# Patient Record
Sex: FEMALE | Race: BLACK OR AFRICAN AMERICAN | NOT HISPANIC OR LATINO | URBAN - METROPOLITAN AREA
[De-identification: names, ages, dates, MRNs, and addresses within clinical notes are randomized per-mention and may not be internally consistent; named-entity substitution may affect disease eponyms.]

---

## 2022-09-20 ENCOUNTER — OFFICE VISIT (OUTPATIENT)
Dept: PRIMARY CARE CLINIC | Facility: CLINIC | Age: 42
End: 2022-09-20
Payer: COMMERCIAL

## 2022-09-20 VITALS
SYSTOLIC BLOOD PRESSURE: 140 MMHG | WEIGHT: 293 LBS | HEART RATE: 105 BPM | HEIGHT: 67 IN | DIASTOLIC BLOOD PRESSURE: 80 MMHG | OXYGEN SATURATION: 100 % | BODY MASS INDEX: 45.99 KG/M2

## 2022-09-20 DIAGNOSIS — R73.9 HYPERGLYCEMIA: ICD-10-CM

## 2022-09-20 DIAGNOSIS — Z12.4 SCREENING FOR CERVICAL CANCER: ICD-10-CM

## 2022-09-20 DIAGNOSIS — Z12.39 ENCOUNTER FOR SCREENING FOR MALIGNANT NEOPLASM OF BREAST, UNSPECIFIED SCREENING MODALITY: ICD-10-CM

## 2022-09-20 DIAGNOSIS — E66.01 CLASS 3 SEVERE OBESITY WITHOUT SERIOUS COMORBIDITY WITH BODY MASS INDEX (BMI) OF 45.0 TO 49.9 IN ADULT, UNSPECIFIED OBESITY TYPE: Primary | ICD-10-CM

## 2022-09-20 PROCEDURE — 1159F PR MEDICATION LIST DOCUMENTED IN MEDICAL RECORD: ICD-10-PCS | Mod: CPTII,S$GLB,, | Performed by: INTERNAL MEDICINE

## 2022-09-20 PROCEDURE — 99204 OFFICE O/P NEW MOD 45 MIN: CPT | Mod: S$GLB,,, | Performed by: INTERNAL MEDICINE

## 2022-09-20 PROCEDURE — 3079F DIAST BP 80-89 MM HG: CPT | Mod: CPTII,S$GLB,, | Performed by: INTERNAL MEDICINE

## 2022-09-20 PROCEDURE — 99204 PR OFFICE/OUTPT VISIT, NEW, LEVL IV, 45-59 MIN: ICD-10-PCS | Mod: S$GLB,,, | Performed by: INTERNAL MEDICINE

## 2022-09-20 PROCEDURE — 1159F MED LIST DOCD IN RCRD: CPT | Mod: CPTII,S$GLB,, | Performed by: INTERNAL MEDICINE

## 2022-09-20 PROCEDURE — 3008F PR BODY MASS INDEX (BMI) DOCUMENTED: ICD-10-PCS | Mod: CPTII,S$GLB,, | Performed by: INTERNAL MEDICINE

## 2022-09-20 PROCEDURE — 3077F SYST BP >= 140 MM HG: CPT | Mod: CPTII,S$GLB,, | Performed by: INTERNAL MEDICINE

## 2022-09-20 PROCEDURE — 3079F PR MOST RECENT DIASTOLIC BLOOD PRESSURE 80-89 MM HG: ICD-10-PCS | Mod: CPTII,S$GLB,, | Performed by: INTERNAL MEDICINE

## 2022-09-20 PROCEDURE — 3077F PR MOST RECENT SYSTOLIC BLOOD PRESSURE >= 140 MM HG: ICD-10-PCS | Mod: CPTII,S$GLB,, | Performed by: INTERNAL MEDICINE

## 2022-09-20 PROCEDURE — 3008F BODY MASS INDEX DOCD: CPT | Mod: CPTII,S$GLB,, | Performed by: INTERNAL MEDICINE

## 2022-09-20 RX ORDER — ARIPIPRAZOLE 5 MG/1
5 TABLET ORAL DAILY
COMMUNITY
Start: 2022-06-29

## 2022-09-20 RX ORDER — LISDEXAMFETAMINE DIMESYLATE 70 MG/1
60 CAPSULE ORAL EVERY MORNING
COMMUNITY
Start: 2022-09-12

## 2022-09-20 NOTE — PROGRESS NOTES
"Subjective:      Patient ID: Chele Welch is a 41 y.o. female.    Chief Complaint: Establish Care (She does have "white coat syndrome." She does see psych every 3 mths. )    HPI    Past Medical History:   Diagnosis Date    ADHD (attention deficit hyperactivity disorder)     Depression     PCOS (polycystic ovarian syndrome)     Sickle cell trait        History reviewed. No pertinent surgical history.     Family History   Problem Relation Age of Onset    Heart disease Mother     Depression Mother     COPD Mother     Diabetes Mother     Hypertension Mother     Heart disease Father     Hyperlipidemia Father     Hypertension Father     Sickle cell trait Father     Diabetes Sister     Hypertension Sister    Maternal GM had cervical cancer    Social History     Socioeconomic History    Marital status: Unknown   Tobacco Use    Smoking status: Never     Passive exposure: Never    Smokeless tobacco: Never   Substance and Sexual Activity    Alcohol use: Yes     Comment: OCC    Drug use: Never    Sexual activity: Yes     Partners: Male     Birth control/protection: I.U.D.         Review of Systems   Constitutional:  Negative for chills, fever and weight loss.   Eyes:  Negative for blurred vision.   Respiratory:  Negative for cough, shortness of breath and wheezing.    Cardiovascular:  Negative for chest pain, palpitations and leg swelling.   Gastrointestinal:  Negative for abdominal pain, constipation, diarrhea, nausea and vomiting.   Genitourinary:  Negative for dysuria, frequency and urgency.   Musculoskeletal:  Negative for falls.   Skin:  Negative for rash.   Neurological:  Negative for dizziness and headaches.   Psychiatric/Behavioral:  Positive for depression. Negative for substance abuse and suicidal ideas. The patient is nervous/anxious.    Objective:     Physical Exam  Vitals reviewed.   Constitutional:       Appearance: Normal appearance. She is obese.   HENT:      Head: Normocephalic.   Eyes:      Extraocular " "Movements: Extraocular movements intact.      Conjunctiva/sclera: Conjunctivae normal.      Pupils: Pupils are equal, round, and reactive to light.   Cardiovascular:      Rate and Rhythm: Normal rate and regular rhythm.   Pulmonary:      Effort: Pulmonary effort is normal.      Breath sounds: Normal breath sounds.   Abdominal:      General: Bowel sounds are normal.   Musculoskeletal:         General: Normal range of motion.   Skin:     General: Skin is warm.      Capillary Refill: Capillary refill takes less than 2 seconds.   Neurological:      General: No focal deficit present.      Mental Status: She is alert and oriented to person, place, and time.   Psychiatric:         Mood and Affect: Mood normal.     BP (!) 140/80 (BP Location: Left arm, Patient Position: Sitting, BP Method: Medium (Manual))   Pulse 105   Ht 5' 7" (1.702 m)   Wt 136.1 kg (300 lb)   SpO2 100%   BMI 46.99 kg/m²     Assessment:       ICD-10-CM ICD-9-CM   1. Class 3 severe obesity without serious comorbidity with body mass index (BMI) of 45.0 to 49.9 in adult, unspecified obesity type  E66.01 278.01    Z68.42 V85.42   2. Encounter for screening for malignant neoplasm of breast, unspecified screening modality  Z12.39 V76.10   3. Screening for cervical cancer  Z12.4 V76.2       Plan:     Medication List with Changes/Refills   Current Medications    ARIPIPRAZOLE (ABILIFY) 5 MG TAB    Take 5 mg by mouth once daily.    VYVANSE 60 MG CAPSULE    Take 60 mg by mouth every morning.        Class 3 severe obesity without serious comorbidity with body mass index (BMI) of 45.0 to 49.9 in adult, unspecified obesity type  -     Ambulatory referral/consult to Nutrition Services; Future; Expected date: 09/27/2022    Encounter for screening for malignant neoplasm of breast, unspecified screening modality  -     Mammo Digital Screening Bilat; Future; Expected date: 09/20/2022    Screening for cervical cancer  -     Ambulatory referral/consult to Obstetrics / " Gynecology; Future; Expected date: 09/27/2022

## 2022-09-21 ENCOUNTER — PATIENT MESSAGE (OUTPATIENT)
Dept: PRIMARY CARE CLINIC | Facility: CLINIC | Age: 42
End: 2022-09-21
Payer: COMMERCIAL

## 2022-10-11 ENCOUNTER — OFFICE VISIT (OUTPATIENT)
Dept: OBSTETRICS AND GYNECOLOGY | Facility: CLINIC | Age: 42
End: 2022-10-11
Payer: COMMERCIAL

## 2022-10-11 VITALS
BODY MASS INDEX: 47.3 KG/M2 | SYSTOLIC BLOOD PRESSURE: 133 MMHG | WEIGHT: 293 LBS | HEART RATE: 103 BPM | DIASTOLIC BLOOD PRESSURE: 70 MMHG

## 2022-10-11 DIAGNOSIS — E66.01 CLASS 3 SEVERE OBESITY DUE TO EXCESS CALORIES WITHOUT SERIOUS COMORBIDITY WITH BODY MASS INDEX (BMI) OF 45.0 TO 49.9 IN ADULT: ICD-10-CM

## 2022-10-11 DIAGNOSIS — Z01.419 WELL WOMAN EXAM WITH ROUTINE GYNECOLOGICAL EXAM: Primary | ICD-10-CM

## 2022-10-11 DIAGNOSIS — Z12.4 SCREENING FOR CERVICAL CANCER: ICD-10-CM

## 2022-10-11 PROBLEM — E66.813 CLASS 3 SEVERE OBESITY DUE TO EXCESS CALORIES WITH BODY MASS INDEX (BMI) OF 45.0 TO 49.9 IN ADULT: Status: ACTIVE | Noted: 2022-10-11

## 2022-10-11 PROCEDURE — 1160F PR REVIEW ALL MEDS BY PRESCRIBER/CLIN PHARMACIST DOCUMENTED: ICD-10-PCS | Mod: CPTII,S$GLB,, | Performed by: STUDENT IN AN ORGANIZED HEALTH CARE EDUCATION/TRAINING PROGRAM

## 2022-10-11 PROCEDURE — 3078F PR MOST RECENT DIASTOLIC BLOOD PRESSURE < 80 MM HG: ICD-10-PCS | Mod: CPTII,S$GLB,, | Performed by: STUDENT IN AN ORGANIZED HEALTH CARE EDUCATION/TRAINING PROGRAM

## 2022-10-11 PROCEDURE — 1160F RVW MEDS BY RX/DR IN RCRD: CPT | Mod: CPTII,S$GLB,, | Performed by: STUDENT IN AN ORGANIZED HEALTH CARE EDUCATION/TRAINING PROGRAM

## 2022-10-11 PROCEDURE — 3078F DIAST BP <80 MM HG: CPT | Mod: CPTII,S$GLB,, | Performed by: STUDENT IN AN ORGANIZED HEALTH CARE EDUCATION/TRAINING PROGRAM

## 2022-10-11 PROCEDURE — 3075F SYST BP GE 130 - 139MM HG: CPT | Mod: CPTII,S$GLB,, | Performed by: STUDENT IN AN ORGANIZED HEALTH CARE EDUCATION/TRAINING PROGRAM

## 2022-10-11 PROCEDURE — 99386 PREV VISIT NEW AGE 40-64: CPT | Mod: S$GLB,,, | Performed by: STUDENT IN AN ORGANIZED HEALTH CARE EDUCATION/TRAINING PROGRAM

## 2022-10-11 PROCEDURE — 3008F PR BODY MASS INDEX (BMI) DOCUMENTED: ICD-10-PCS | Mod: CPTII,S$GLB,, | Performed by: STUDENT IN AN ORGANIZED HEALTH CARE EDUCATION/TRAINING PROGRAM

## 2022-10-11 PROCEDURE — 3008F BODY MASS INDEX DOCD: CPT | Mod: CPTII,S$GLB,, | Performed by: STUDENT IN AN ORGANIZED HEALTH CARE EDUCATION/TRAINING PROGRAM

## 2022-10-11 PROCEDURE — 3075F PR MOST RECENT SYSTOLIC BLOOD PRESS GE 130-139MM HG: ICD-10-PCS | Mod: CPTII,S$GLB,, | Performed by: STUDENT IN AN ORGANIZED HEALTH CARE EDUCATION/TRAINING PROGRAM

## 2022-10-11 PROCEDURE — 99386 PR PREVENTIVE VISIT,NEW,40-64: ICD-10-PCS | Mod: S$GLB,,, | Performed by: STUDENT IN AN ORGANIZED HEALTH CARE EDUCATION/TRAINING PROGRAM

## 2022-10-11 PROCEDURE — 1159F MED LIST DOCD IN RCRD: CPT | Mod: CPTII,S$GLB,, | Performed by: STUDENT IN AN ORGANIZED HEALTH CARE EDUCATION/TRAINING PROGRAM

## 2022-10-11 PROCEDURE — 1159F PR MEDICATION LIST DOCUMENTED IN MEDICAL RECORD: ICD-10-PCS | Mod: CPTII,S$GLB,, | Performed by: STUDENT IN AN ORGANIZED HEALTH CARE EDUCATION/TRAINING PROGRAM

## 2022-10-11 RX ORDER — DULOXETIN HYDROCHLORIDE 60 MG/1
60 CAPSULE, DELAYED RELEASE ORAL DAILY
COMMUNITY
Start: 2022-06-29

## 2022-10-11 NOTE — PROGRESS NOTES
Chief Complaint: Annual Exam    HPI: 42 y.o.  here for Annual Exam.  She has no complaints today.    Review of Systems   Constitutional:  Negative for chills and fever.   HENT:  Negative for congestion and sore throat.    Respiratory:  Negative for cough and shortness of breath.    Cardiovascular:  Negative for chest pain and palpitations.   Gastrointestinal:  Negative for abdominal pain, nausea and vomiting.   Genitourinary:  Negative for dysuria, frequency and urgency.   Musculoskeletal:  Negative for back pain.   Skin:  Negative for itching and rash.   Neurological:  Negative for headaches.   All other systems reviewed and are negative.    Past Medical History ADHD, depression, SCT, PCOS  Past Surgical History none  Past OB History: G0  Past Gyn History: Denies prior abnormal pap smears, denies STD's, menstrual history as above.   Contraception History: IUD  Social History: + etoh use, denies t/d  Family History denies breast, colon, ovarian, or uterine cancer  Allergies: NKDA  Current Outpatient Medications   Medication Instructions    ARIPiprazole (ABILIFY) 5 mg, Oral, Daily    DULoxetine (CYMBALTA) 60 MG capsule No dose, route, or frequency recorded.    VYVANSE 60 mg, Oral, Every morning     Physical Exam:  Vitals:    10/11/22 1022   BP: 133/70   Pulse: 103     Body mass index is 47.3 kg/m².  Physical Exam  Vitals reviewed. Exam conducted with a chaperone present.   Constitutional:       Appearance: Normal appearance. She is obese.   HENT:      Head: Normocephalic and atraumatic.   Eyes:      Extraocular Movements: Extraocular movements intact.      Pupils: Pupils are equal, round, and reactive to light.   Pulmonary:      Effort: Pulmonary effort is normal. No respiratory distress.   Chest:   Breasts:     Breasts are symmetrical.      Right: No inverted nipple, mass, nipple discharge, skin change or tenderness.      Left: No inverted nipple, mass, nipple discharge, skin change or tenderness.    Abdominal:      General: There is no distension.      Palpations: Abdomen is soft.      Tenderness: There is no abdominal tenderness.   Genitourinary:     Comments: Normal external female genitalia, no masses or lesions, vagina pink with rugated, no vaginal bleeding or discharge, not friable cervix  Musculoskeletal:         General: No swelling or tenderness. Normal range of motion.      Cervical back: Normal range of motion and neck supple.   Lymphadenopathy:      Upper Body:      Right upper body: No supraclavicular or axillary adenopathy.      Left upper body: No supraclavicular or axillary adenopathy.   Skin:     General: Skin is warm and dry.   Neurological:      General: No focal deficit present.      Mental Status: She is alert and oriented to person, place, and time.        ASSESSMENT:   Patient is a 42 y.o.  who presents for annual exam     Patient Active Problem List   Diagnosis    Class 3 severe obesity due to excess calories with body mass index (BMI) of 45.0 to 49.9 in adult     PLAN:  Pap today   Mammogram ordered today  Counseling on self-breast exams, diet, and exercise.  Return to clinic in 1 year for annual sooner if needed

## 2022-10-14 LAB — Lab: NORMAL

## 2023-01-17 LAB — BCS RECOMMENDATION EXT: NORMAL

## 2023-01-23 ENCOUNTER — PATIENT MESSAGE (OUTPATIENT)
Dept: PRIMARY CARE CLINIC | Facility: CLINIC | Age: 43
End: 2023-01-23
Payer: COMMERCIAL

## 2023-01-23 ENCOUNTER — PATIENT OUTREACH (OUTPATIENT)
Dept: ADMINISTRATIVE | Facility: HOSPITAL | Age: 43
End: 2023-01-23
Payer: COMMERCIAL

## 2023-01-26 ENCOUNTER — OFFICE VISIT (OUTPATIENT)
Dept: PRIMARY CARE CLINIC | Facility: CLINIC | Age: 43
End: 2023-01-26
Payer: COMMERCIAL

## 2023-01-26 VITALS
WEIGHT: 293 LBS | HEIGHT: 67 IN | SYSTOLIC BLOOD PRESSURE: 150 MMHG | BODY MASS INDEX: 45.99 KG/M2 | OXYGEN SATURATION: 99 % | HEART RATE: 86 BPM | DIASTOLIC BLOOD PRESSURE: 90 MMHG

## 2023-01-26 DIAGNOSIS — E28.2 PCOS (POLYCYSTIC OVARIAN SYNDROME): ICD-10-CM

## 2023-01-26 DIAGNOSIS — Z13.1 SCREENING FOR DIABETES MELLITUS: Primary | ICD-10-CM

## 2023-01-26 DIAGNOSIS — E66.01 CLASS 3 SEVERE OBESITY WITHOUT SERIOUS COMORBIDITY WITH BODY MASS INDEX (BMI) OF 45.0 TO 49.9 IN ADULT, UNSPECIFIED OBESITY TYPE: ICD-10-CM

## 2023-01-26 PROCEDURE — 3044F HG A1C LEVEL LT 7.0%: CPT | Mod: CPTII,S$GLB,, | Performed by: INTERNAL MEDICINE

## 2023-01-26 PROCEDURE — 3008F PR BODY MASS INDEX (BMI) DOCUMENTED: ICD-10-PCS | Mod: CPTII,S$GLB,, | Performed by: INTERNAL MEDICINE

## 2023-01-26 PROCEDURE — 3044F PR MOST RECENT HEMOGLOBIN A1C LEVEL <7.0%: ICD-10-PCS | Mod: CPTII,S$GLB,, | Performed by: INTERNAL MEDICINE

## 2023-01-26 PROCEDURE — 1159F MED LIST DOCD IN RCRD: CPT | Mod: CPTII,S$GLB,, | Performed by: INTERNAL MEDICINE

## 2023-01-26 PROCEDURE — 3077F SYST BP >= 140 MM HG: CPT | Mod: CPTII,S$GLB,, | Performed by: INTERNAL MEDICINE

## 2023-01-26 PROCEDURE — 1159F PR MEDICATION LIST DOCUMENTED IN MEDICAL RECORD: ICD-10-PCS | Mod: CPTII,S$GLB,, | Performed by: INTERNAL MEDICINE

## 2023-01-26 PROCEDURE — 99214 OFFICE O/P EST MOD 30 MIN: CPT | Mod: S$GLB,,, | Performed by: INTERNAL MEDICINE

## 2023-01-26 PROCEDURE — 99214 PR OFFICE/OUTPT VISIT, EST, LEVL IV, 30-39 MIN: ICD-10-PCS | Mod: S$GLB,,, | Performed by: INTERNAL MEDICINE

## 2023-01-26 PROCEDURE — 3008F BODY MASS INDEX DOCD: CPT | Mod: CPTII,S$GLB,, | Performed by: INTERNAL MEDICINE

## 2023-01-26 PROCEDURE — 3080F DIAST BP >= 90 MM HG: CPT | Mod: CPTII,S$GLB,, | Performed by: INTERNAL MEDICINE

## 2023-01-26 PROCEDURE — 3077F PR MOST RECENT SYSTOLIC BLOOD PRESSURE >= 140 MM HG: ICD-10-PCS | Mod: CPTII,S$GLB,, | Performed by: INTERNAL MEDICINE

## 2023-01-26 PROCEDURE — 3080F PR MOST RECENT DIASTOLIC BLOOD PRESSURE >= 90 MM HG: ICD-10-PCS | Mod: CPTII,S$GLB,, | Performed by: INTERNAL MEDICINE

## 2023-01-26 RX ORDER — METFORMIN HYDROCHLORIDE 500 MG/1
500 TABLET ORAL
Qty: 90 TABLET | Refills: 0 | Status: SHIPPED | OUTPATIENT
Start: 2023-01-26 | End: 2023-06-01 | Stop reason: SDUPTHER

## 2023-01-26 NOTE — PROGRESS NOTES
Subjective:      Patient ID: Chele Welch is a 42 y.o. female.    Chief Complaint: Follow-up and MAMMOGRAM RESULT     HPI    Past Medical History:   Diagnosis Date    ADHD (attention deficit hyperactivity disorder)     Depression     PCOS (polycystic ovarian syndrome)     Sickle cell trait      Patient here for follow up. She has been established with Dr Razo and she sees a psychiatrist. She has not lost any weight. She is morbidly obese.   She also has some hirsutism noted. I did refer her to nutrition at the last visit  She has no acute complaints      Review of Systems   Constitutional:  Negative for chills, fever and weight loss.   Respiratory:  Negative for cough, shortness of breath and wheezing.    Cardiovascular:  Negative for chest pain, palpitations and leg swelling.   Gastrointestinal:  Negative for abdominal pain, constipation, diarrhea, nausea and vomiting.   Genitourinary:  Negative for dysuria, frequency and urgency.   Musculoskeletal:  Negative for falls and joint pain.   Skin:  Negative for rash.   Neurological:  Negative for dizziness and headaches.   Psychiatric/Behavioral:  Positive for depression. Negative for suicidal ideas. The patient is nervous/anxious.    Objective:     Physical Exam  Vitals reviewed.   Constitutional:       Appearance: Normal appearance. She is obese.   HENT:      Head: Normocephalic.      Mouth/Throat:      Mouth: Mucous membranes are moist.      Pharynx: Oropharynx is clear.   Eyes:      Extraocular Movements: Extraocular movements intact.      Conjunctiva/sclera: Conjunctivae normal.      Pupils: Pupils are equal, round, and reactive to light.   Cardiovascular:      Rate and Rhythm: Normal rate and regular rhythm.   Pulmonary:      Effort: Pulmonary effort is normal.      Breath sounds: Normal breath sounds.   Abdominal:      General: Bowel sounds are normal.   Musculoskeletal:      Right lower leg: No edema.      Left lower leg: No edema.   Skin:     General:  "Skin is warm.   Neurological:      General: No focal deficit present.      Mental Status: She is alert and oriented to person, place, and time.   Psychiatric:         Mood and Affect: Mood normal.     BP (!) 150/90 (BP Location: Right arm, Patient Position: Sitting, BP Method: Large (Manual))   Pulse 86   Ht 5' 7" (1.702 m)   Wt 135.6 kg (299 lb)   SpO2 99%   BMI 46.83 kg/m²     Assessment:       ICD-10-CM ICD-9-CM   1. Screening for diabetes mellitus  Z13.1 V77.1   2. PCOS (polycystic ovarian syndrome)  E28.2 256.4   3. Class 3 severe obesity without serious comorbidity with body mass index (BMI) of 45.0 to 49.9 in adult, unspecified obesity type  E66.01 278.01    Z68.42 V85.42       Plan:     Medication List with Changes/Refills   New Medications    METFORMIN (GLUCOPHAGE) 500 MG TABLET    Take 1 tablet (500 mg total) by mouth daily with breakfast.   Current Medications    ARIPIPRAZOLE (ABILIFY) 5 MG TAB    Take 5 mg by mouth once daily.    DULOXETINE (CYMBALTA) 60 MG CAPSULE    Take 60 mg by mouth once daily.    VYVANSE 60 MG CAPSULE    Take 60 mg by mouth every morning.        1. Screening for diabetes mellitus  -     Hemoglobin A1C; Future; Expected date: 01/26/2023    2. PCOS (polycystic ovarian syndrome)  -     metFORMIN (GLUCOPHAGE) 500 MG tablet; Take 1 tablet (500 mg total) by mouth daily with breakfast.  Dispense: 90 tablet; Refill: 0    3. Class 3 severe obesity without serious comorbidity with body mass index (BMI) of 45.0 to 49.9 in adult, unspecified obesity type  -     Lipid Panel; Future; Expected date: 01/26/2023       Counseled again on diet and exercise. Avoid carbonated or beverages high in sugar content        Future Appointments   Date Time Provider Department Center   5/2/2023 11:00 AM Ute Gao MD [facility]   10/26/2023 10:30 AM Tavares Razo MD [id number]  [place]                "

## 2023-01-27 LAB
CHOLEST SERPL-MCNC: 222 MG/DL
CHOLEST/HDLC SERPL: 5 (CALC)
HBA1C MFR BLD: 6 % OF TOTAL HGB
HDLC SERPL-MCNC: 44 MG/DL
LDLC SERPL CALC-MCNC: 157 MG/DL (CALC)
NONHDLC SERPL-MCNC: 178 MG/DL (CALC)
TRIGL SERPL-MCNC: 98 MG/DL

## 2023-02-28 ENCOUNTER — PATIENT MESSAGE (OUTPATIENT)
Dept: PRIMARY CARE CLINIC | Facility: CLINIC | Age: 43
End: 2023-02-28
Payer: COMMERCIAL

## 2023-03-31 ENCOUNTER — PATIENT MESSAGE (OUTPATIENT)
Dept: FAMILY MEDICINE | Facility: CLINIC | Age: 43
End: 2023-03-31
Payer: COMMERCIAL

## 2023-06-01 ENCOUNTER — OFFICE VISIT (OUTPATIENT)
Dept: PRIMARY CARE CLINIC | Facility: CLINIC | Age: 43
End: 2023-06-01
Payer: COMMERCIAL

## 2023-06-01 VITALS
HEART RATE: 96 BPM | BODY MASS INDEX: 45.99 KG/M2 | HEIGHT: 67 IN | DIASTOLIC BLOOD PRESSURE: 86 MMHG | OXYGEN SATURATION: 98 % | WEIGHT: 293 LBS | SYSTOLIC BLOOD PRESSURE: 130 MMHG

## 2023-06-01 DIAGNOSIS — E66.01 CLASS 3 SEVERE OBESITY WITHOUT SERIOUS COMORBIDITY WITH BODY MASS INDEX (BMI) OF 45.0 TO 49.9 IN ADULT, UNSPECIFIED OBESITY TYPE: Primary | ICD-10-CM

## 2023-06-01 DIAGNOSIS — E28.2 PCOS (POLYCYSTIC OVARIAN SYNDROME): ICD-10-CM

## 2023-06-01 PROCEDURE — 3075F PR MOST RECENT SYSTOLIC BLOOD PRESS GE 130-139MM HG: ICD-10-PCS | Mod: CPTII,S$GLB,, | Performed by: INTERNAL MEDICINE

## 2023-06-01 PROCEDURE — 3044F PR MOST RECENT HEMOGLOBIN A1C LEVEL <7.0%: ICD-10-PCS | Mod: CPTII,S$GLB,, | Performed by: INTERNAL MEDICINE

## 2023-06-01 PROCEDURE — 99213 OFFICE O/P EST LOW 20 MIN: CPT | Mod: S$GLB,,, | Performed by: INTERNAL MEDICINE

## 2023-06-01 PROCEDURE — 99213 PR OFFICE/OUTPT VISIT, EST, LEVL III, 20-29 MIN: ICD-10-PCS | Mod: S$GLB,,, | Performed by: INTERNAL MEDICINE

## 2023-06-01 PROCEDURE — 3008F BODY MASS INDEX DOCD: CPT | Mod: CPTII,S$GLB,, | Performed by: INTERNAL MEDICINE

## 2023-06-01 PROCEDURE — 3079F DIAST BP 80-89 MM HG: CPT | Mod: CPTII,S$GLB,, | Performed by: INTERNAL MEDICINE

## 2023-06-01 PROCEDURE — 1159F PR MEDICATION LIST DOCUMENTED IN MEDICAL RECORD: ICD-10-PCS | Mod: CPTII,S$GLB,, | Performed by: INTERNAL MEDICINE

## 2023-06-01 PROCEDURE — 3044F HG A1C LEVEL LT 7.0%: CPT | Mod: CPTII,S$GLB,, | Performed by: INTERNAL MEDICINE

## 2023-06-01 PROCEDURE — 3079F PR MOST RECENT DIASTOLIC BLOOD PRESSURE 80-89 MM HG: ICD-10-PCS | Mod: CPTII,S$GLB,, | Performed by: INTERNAL MEDICINE

## 2023-06-01 PROCEDURE — 3008F PR BODY MASS INDEX (BMI) DOCUMENTED: ICD-10-PCS | Mod: CPTII,S$GLB,, | Performed by: INTERNAL MEDICINE

## 2023-06-01 PROCEDURE — 1159F MED LIST DOCD IN RCRD: CPT | Mod: CPTII,S$GLB,, | Performed by: INTERNAL MEDICINE

## 2023-06-01 PROCEDURE — 3075F SYST BP GE 130 - 139MM HG: CPT | Mod: CPTII,S$GLB,, | Performed by: INTERNAL MEDICINE

## 2023-06-01 RX ORDER — METFORMIN HYDROCHLORIDE 500 MG/1
500 TABLET ORAL
Qty: 90 TABLET | Refills: 0 | Status: SHIPPED | OUTPATIENT
Start: 2023-06-01 | End: 2023-09-05

## 2023-06-01 NOTE — PROGRESS NOTES
Subjective:      Patient ID: Chele Welch is a 42 y.o. female.    Chief Complaint: Follow-up (She states has not used the metformin ever. )    HPI    Past Medical History:   Diagnosis Date    ADHD (attention deficit hyperactivity disorder)     Depression     PCOS (polycystic ovarian syndrome)     Sickle cell trait          Patient here for follow up. She states she has been out of town and did not hear back from the nutritionist and has not stated her metformin     Review of Systems   Constitutional:  Negative for chills and fever.   HENT:  Negative for hearing loss.    Eyes:  Negative for blurred vision.   Respiratory:  Negative for cough, shortness of breath and wheezing.    Cardiovascular:  Negative for chest pain, palpitations and leg swelling.   Gastrointestinal:  Negative for abdominal pain, blood in stool, constipation, diarrhea, melena, nausea and vomiting.   Genitourinary:  Negative for dysuria, frequency and urgency.   Musculoskeletal:  Negative for falls.   Skin:  Negative for rash.   Neurological:  Negative for dizziness and headaches.   Endo/Heme/Allergies:  Does not bruise/bleed easily.   Psychiatric/Behavioral:  Negative for depression. The patient is not nervous/anxious.    Objective:     Physical Exam  Vitals reviewed.   Constitutional:       Appearance: Normal appearance.   HENT:      Head: Normocephalic.      Mouth/Throat:      Mouth: Mucous membranes are moist.      Pharynx: Oropharynx is clear.   Eyes:      Extraocular Movements: Extraocular movements intact.      Conjunctiva/sclera: Conjunctivae normal.      Pupils: Pupils are equal, round, and reactive to light.   Cardiovascular:      Rate and Rhythm: Normal rate and regular rhythm.   Pulmonary:      Effort: Pulmonary effort is normal.      Breath sounds: Normal breath sounds.   Abdominal:      General: Bowel sounds are normal.   Musculoskeletal:      Right lower leg: No edema.      Left lower leg: No edema.   Skin:     General: Skin is  "warm.      Capillary Refill: Capillary refill takes less than 2 seconds.   Neurological:      General: No focal deficit present.      Mental Status: She is alert.   Psychiatric:         Mood and Affect: Mood normal.     /86 (BP Location: Left arm, Patient Position: Sitting, BP Method: Medium (Automatic))   Pulse 96   Ht 5' 7" (1.702 m)   Wt (!) 140.3 kg (309 lb 3.2 oz)   SpO2 98%   BMI 48.43 kg/m²     Assessment:       ICD-10-CM ICD-9-CM   1. Class 3 severe obesity without serious comorbidity with body mass index (BMI) of 45.0 to 49.9 in adult, unspecified obesity type  E66.01 278.01    Z68.42 V85.42   2. PCOS (polycystic ovarian syndrome)  E28.2 256.4       Plan:     Medication List with Changes/Refills   Current Medications    ARIPIPRAZOLE (ABILIFY) 5 MG TAB    Take 5 mg by mouth once daily.    DULOXETINE (CYMBALTA) 60 MG CAPSULE    Take 60 mg by mouth once daily.    LISDEXAMFETAMINE (VYVANSE) 70 MG CAPSULE    Take 60 mg by mouth every morning.   Changed and/or Refilled Medications    Modified Medication Previous Medication    METFORMIN (GLUCOPHAGE) 500 MG TABLET metFORMIN (GLUCOPHAGE) 500 MG tablet       Take 1 tablet (500 mg total) by mouth daily with breakfast.    Take 1 tablet (500 mg total) by mouth daily with breakfast.        1. Class 3 severe obesity without serious comorbidity with body mass index (BMI) of 45.0 to 49.9 in adult, unspecified obesity type  -     Ambulatory referral/consult to Nutrition Services; Future; Expected date: 06/11/2023    2. PCOS (polycystic ovarian syndrome)  -     metFORMIN (GLUCOPHAGE) 500 MG tablet; Take 1 tablet (500 mg total) by mouth daily with breakfast.  Dispense: 90 tablet; Refill: 0           Future Appointments   Date Time Provider Department Center   9/1/2023  9:40 AM Ute Gao MD [id number]   10/26/2023 10:30 AM Tavares Razo MD [id number] [place]                    "

## 2023-08-04 ENCOUNTER — PATIENT MESSAGE (OUTPATIENT)
Dept: PRIMARY CARE CLINIC | Facility: CLINIC | Age: 43
End: 2023-08-04
Payer: COMMERCIAL

## 2023-08-07 DIAGNOSIS — E28.2 PCOS (POLYCYSTIC OVARIAN SYNDROME): ICD-10-CM

## 2023-08-07 DIAGNOSIS — R73.9 HYPERGLYCEMIA: ICD-10-CM

## 2023-08-07 DIAGNOSIS — R73.03 PRE-DIABETES: Primary | ICD-10-CM

## 2023-08-07 RX ORDER — SEMAGLUTIDE 0.68 MG/ML
0.5 INJECTION, SOLUTION SUBCUTANEOUS
Qty: 4 EACH | Refills: 3 | Status: SHIPPED | OUTPATIENT
Start: 2023-08-07

## 2023-08-09 ENCOUNTER — TELEPHONE (OUTPATIENT)
Dept: PRIMARY CARE CLINIC | Facility: CLINIC | Age: 43
End: 2023-08-09
Payer: COMMERCIAL

## 2023-08-09 NOTE — TELEPHONE ENCOUNTER
PT INFORMED THAT HER INS DENIED THE REQUEST DUE TO MED BEING CONSIDERED INVESTIGATIONAL AND NOT APPROVED FOR THE DX OF PCOS

## 2023-08-09 NOTE — TELEPHONE ENCOUNTER
PT'S INS DENIED THE REQUEST FOR OZEMPIC FOR DX PCOS --INS STATES IT IS CONSIDERED INVESTIGATIONAL--REQUESTED DRUG IS NOT APPROVED BY THE FDA FOR THE REQUESTED MEDICATION/DIAGNOSIS-SERVICES DETERMINED TO BE INVESTIGATIONAL IS NOT COVERED BY [employer]

## 2023-09-01 ENCOUNTER — OFFICE VISIT (OUTPATIENT)
Dept: PRIMARY CARE CLINIC | Facility: CLINIC | Age: 43
End: 2023-09-01
Payer: COMMERCIAL

## 2023-09-01 VITALS
RESPIRATION RATE: 18 BRPM | BODY MASS INDEX: 45.99 KG/M2 | SYSTOLIC BLOOD PRESSURE: 138 MMHG | WEIGHT: 293 LBS | OXYGEN SATURATION: 99 % | HEART RATE: 86 BPM | DIASTOLIC BLOOD PRESSURE: 82 MMHG | HEIGHT: 67 IN

## 2023-09-01 DIAGNOSIS — R73.9 HYPERGLYCEMIA: Primary | ICD-10-CM

## 2023-09-01 PROCEDURE — 3075F PR MOST RECENT SYSTOLIC BLOOD PRESS GE 130-139MM HG: ICD-10-PCS | Mod: CPTII,S$GLB,, | Performed by: INTERNAL MEDICINE

## 2023-09-01 PROCEDURE — 3079F DIAST BP 80-89 MM HG: CPT | Mod: CPTII,S$GLB,, | Performed by: INTERNAL MEDICINE

## 2023-09-01 PROCEDURE — 99213 OFFICE O/P EST LOW 20 MIN: CPT | Mod: S$GLB,,, | Performed by: INTERNAL MEDICINE

## 2023-09-01 PROCEDURE — 1159F MED LIST DOCD IN RCRD: CPT | Mod: CPTII,S$GLB,, | Performed by: INTERNAL MEDICINE

## 2023-09-01 PROCEDURE — 99213 PR OFFICE/OUTPT VISIT, EST, LEVL III, 20-29 MIN: ICD-10-PCS | Mod: S$GLB,,, | Performed by: INTERNAL MEDICINE

## 2023-09-01 PROCEDURE — 3008F BODY MASS INDEX DOCD: CPT | Mod: CPTII,S$GLB,, | Performed by: INTERNAL MEDICINE

## 2023-09-01 PROCEDURE — 3044F PR MOST RECENT HEMOGLOBIN A1C LEVEL <7.0%: ICD-10-PCS | Mod: CPTII,S$GLB,, | Performed by: INTERNAL MEDICINE

## 2023-09-01 PROCEDURE — 3008F PR BODY MASS INDEX (BMI) DOCUMENTED: ICD-10-PCS | Mod: CPTII,S$GLB,, | Performed by: INTERNAL MEDICINE

## 2023-09-01 PROCEDURE — 3079F PR MOST RECENT DIASTOLIC BLOOD PRESSURE 80-89 MM HG: ICD-10-PCS | Mod: CPTII,S$GLB,, | Performed by: INTERNAL MEDICINE

## 2023-09-01 PROCEDURE — 1159F PR MEDICATION LIST DOCUMENTED IN MEDICAL RECORD: ICD-10-PCS | Mod: CPTII,S$GLB,, | Performed by: INTERNAL MEDICINE

## 2023-09-01 PROCEDURE — 3075F SYST BP GE 130 - 139MM HG: CPT | Mod: CPTII,S$GLB,, | Performed by: INTERNAL MEDICINE

## 2023-09-01 PROCEDURE — 3044F HG A1C LEVEL LT 7.0%: CPT | Mod: CPTII,S$GLB,, | Performed by: INTERNAL MEDICINE

## 2023-09-01 NOTE — PROGRESS NOTES
Subjective:      Patient ID: Chele Welch is a 42 y.o. female.    Chief Complaint: Follow-up (She would also like to get a vitamin d level. )    HPI    Past Medical History:   Diagnosis Date    ADHD (attention deficit hyperactivity disorder)     Depression     PCOS (polycystic ovarian syndrome)     Sickle cell trait      Patient states she is needing physical for application of a CNA.   Ozempic was sent for her obesity/PCOS but not covered by her insurance as she is not diabetic  She does not have any paperwork for her physical       Review of Systems   Constitutional:  Negative for chills and fever.   HENT:  Negative for hearing loss.    Eyes:  Negative for blurred vision.   Respiratory:  Negative for cough, shortness of breath and wheezing.    Cardiovascular:  Negative for chest pain, palpitations and leg swelling.   Gastrointestinal:  Negative for abdominal pain, blood in stool, constipation, diarrhea, melena, nausea and vomiting.   Genitourinary:  Negative for dysuria, frequency and urgency.   Musculoskeletal:  Negative for falls.   Skin:  Negative for rash.   Neurological:  Negative for dizziness and headaches.   Endo/Heme/Allergies:  Does not bruise/bleed easily.   Psychiatric/Behavioral:  Negative for depression. The patient is not nervous/anxious.      Objective:     Physical Exam  Vitals reviewed.   Constitutional:       Appearance: Normal appearance.   HENT:      Head: Normocephalic.      Mouth/Throat:      Mouth: Mucous membranes are moist.      Pharynx: Oropharynx is clear.   Eyes:      Extraocular Movements: Extraocular movements intact.      Conjunctiva/sclera: Conjunctivae normal.      Pupils: Pupils are equal, round, and reactive to light.   Cardiovascular:      Rate and Rhythm: Normal rate and regular rhythm.   Pulmonary:      Effort: Pulmonary effort is normal.      Breath sounds: Normal breath sounds.   Abdominal:      General: Bowel sounds are normal.   Musculoskeletal:      Right lower leg:  "No edema.      Left lower leg: No edema.   Skin:     General: Skin is warm.      Capillary Refill: Capillary refill takes less than 2 seconds.   Neurological:      General: No focal deficit present.      Mental Status: She is alert.   Psychiatric:         Mood and Affect: Mood normal.       /82 (BP Location: Left arm, Patient Position: Sitting, BP Method: X-Large (Automatic))   Pulse 86   Resp 18   Ht 5' 7" (1.702 m)   Wt (!) 138.9 kg (306 lb 3.2 oz)   SpO2 99%   BMI 47.96 kg/m²     Assessment:       ICD-10-CM ICD-9-CM   1. Hyperglycemia  R73.9 790.29       Plan:     Medication List with Changes/Refills   Current Medications    ARIPIPRAZOLE (ABILIFY) 5 MG TAB    Take 5 mg by mouth once daily.    DULOXETINE (CYMBALTA) 60 MG CAPSULE    Take 60 mg by mouth once daily.    LISDEXAMFETAMINE (VYVANSE) 70 MG CAPSULE    Take 60 mg by mouth every morning.    METFORMIN (GLUCOPHAGE) 500 MG TABLET    Take 1 tablet (500 mg total) by mouth daily with breakfast.    SEMAGLUTIDE (OZEMPIC) 0.25 MG OR 0.5 MG (2 MG/3 ML) PEN INJECTOR    Inject 0.5 mg into the skin every 7 days.        1. Hyperglycemia  -     POCT Glucose, Hand-Held Device         Future Appointments   Date Time Provider Department Center   10/26/2023 10:30 AM Tavares Razo MD [id number] [place]                      "

## 2023-09-03 DIAGNOSIS — E28.2 PCOS (POLYCYSTIC OVARIAN SYNDROME): ICD-10-CM

## 2023-09-05 RX ORDER — METFORMIN HYDROCHLORIDE 500 MG/1
500 TABLET ORAL
Qty: 90 TABLET | Refills: 0 | Status: SHIPPED | OUTPATIENT
Start: 2023-09-05

## 2023-11-22 ENCOUNTER — PATIENT MESSAGE (OUTPATIENT)
Dept: PRIMARY CARE CLINIC | Facility: CLINIC | Age: 43
End: 2023-11-22
Payer: COMMERCIAL

## 2024-09-06 ENCOUNTER — PATIENT MESSAGE (OUTPATIENT)
Dept: PRIMARY CARE CLINIC | Facility: CLINIC | Age: 44
End: 2024-09-06
Payer: COMMERCIAL

## 2025-05-09 ENCOUNTER — PATIENT MESSAGE (OUTPATIENT)
Facility: CLINIC | Age: 45
End: 2025-05-09
Payer: COMMERCIAL

## 2025-07-16 ENCOUNTER — CLINICAL SUPPORT (OUTPATIENT)
Dept: URGENT CARE | Facility: URGENT CARE | Age: 45
End: 2025-07-16

## 2025-07-16 DIAGNOSIS — Z11.7 ENCOUNTER FOR TESTING FOR LATENT TUBERCULOSIS: Primary | ICD-10-CM

## 2025-07-16 PROCEDURE — 36415 COLL VENOUS BLD VENIPUNCTURE: CPT

## 2025-07-16 PROCEDURE — 86481 TB AG RESPONSE T-CELL SUSP: CPT

## 2025-07-17 LAB
M TB TUBERC IFN-G BLD QL: NEGATIVE
TBGOLD MITO: 9.23 IU/ML
TBGOLD NIL: 0.77 IU/ML
TBGOLD TB1-NIL: -0.11 IU/ML
TBGOLD TB2-NIL: -0.34 IU/ML